# Patient Record
Sex: FEMALE | Race: BLACK OR AFRICAN AMERICAN | NOT HISPANIC OR LATINO | Employment: STUDENT | URBAN - METROPOLITAN AREA
[De-identification: names, ages, dates, MRNs, and addresses within clinical notes are randomized per-mention and may not be internally consistent; named-entity substitution may affect disease eponyms.]

---

## 2024-09-28 ENCOUNTER — HOSPITAL ENCOUNTER (EMERGENCY)
Facility: HOSPITAL | Age: 24
Discharge: HOME | End: 2024-09-28
Attending: STUDENT IN AN ORGANIZED HEALTH CARE EDUCATION/TRAINING PROGRAM
Payer: MEDICAID

## 2024-09-28 VITALS
HEIGHT: 64 IN | HEART RATE: 90 BPM | TEMPERATURE: 97 F | OXYGEN SATURATION: 100 % | DIASTOLIC BLOOD PRESSURE: 60 MMHG | WEIGHT: 120 LBS | SYSTOLIC BLOOD PRESSURE: 110 MMHG | RESPIRATION RATE: 18 BRPM | BODY MASS INDEX: 20.49 KG/M2

## 2024-09-28 DIAGNOSIS — F10.920 ALCOHOLIC INTOXICATION WITHOUT COMPLICATION: Primary | ICD-10-CM

## 2024-09-28 PROCEDURE — 96361 HYDRATE IV INFUSION ADD-ON: CPT

## 2024-09-28 PROCEDURE — 96374 THER/PROPH/DIAG INJ IV PUSH: CPT

## 2024-09-28 PROCEDURE — 99284 EMERGENCY DEPT VISIT MOD MDM: CPT | Mod: 25

## 2024-09-28 PROCEDURE — 96376 TX/PRO/DX INJ SAME DRUG ADON: CPT

## 2024-09-28 PROCEDURE — 63600175 PHARM REV CODE 636 W HCPCS

## 2024-09-28 PROCEDURE — 25000003 PHARM REV CODE 250

## 2024-09-28 RX ORDER — ONDANSETRON 4 MG/1
4 TABLET, ORALLY DISINTEGRATING ORAL
Status: DISCONTINUED | OUTPATIENT
Start: 2024-09-28 | End: 2024-09-28

## 2024-09-28 RX ORDER — ONDANSETRON HYDROCHLORIDE 2 MG/ML
4 INJECTION, SOLUTION INTRAVENOUS
Status: COMPLETED | OUTPATIENT
Start: 2024-09-28 | End: 2024-09-28

## 2024-09-28 RX ADMIN — ONDANSETRON 4 MG: 2 INJECTION INTRAMUSCULAR; INTRAVENOUS at 06:09

## 2024-09-28 RX ADMIN — ONDANSETRON 4 MG: 2 INJECTION INTRAMUSCULAR; INTRAVENOUS at 05:09

## 2024-09-28 RX ADMIN — SODIUM CHLORIDE 1000 ML: 9 INJECTION, SOLUTION INTRAVENOUS at 05:09

## 2024-09-28 NOTE — ED PROVIDER NOTES
Encounter Date: 9/28/2024       History     Chief Complaint   Patient presents with    Alcohol Intoxication     Pt arrived with friend c/o alcohol intoxication, states she started drinking last night, pt is from [place] and is on vacation.     23-year-old female with no reported past medical history presents for evaluation of alcohol intoxication, nausea, and vomiting.  Per patient and friend, patient went out drinking last night and had 2 shots of liquor and 2-1/2 mixed alcoholic drinks in a roughly 3 hour period.  Afterwards, patient became nauseated and vomited repeatedly.  She reports feeling generally unwell, abdominal pain, and dry heaving.  She denies chest pain, shortness of breath, bloody emesis, any other symptoms.  Patient and friend deny any other drug or alcohol use.    The history is provided by the patient and a friend. No  was used.     Review of patient's allergies indicates:   Allergen Reactions    Pcn [penicillins] Nausea And Vomiting     History reviewed. No pertinent past medical history.  History reviewed. No pertinent surgical history.  No family history on file.  Social History     Tobacco Use    Smoking status: Never    Smokeless tobacco: Never   Substance Use Topics    Alcohol use: Yes    Drug use: Never         Physical Exam     Initial Vitals [09/28/24 0501]   BP Pulse Resp Temp SpO2   (!) 114/58 105 20 96.7 °F (35.9 °C) 100 %      MAP       --         Physical Exam    Nursing note and vitals reviewed.  Constitutional: She appears distressed.   Cardiovascular:  Regular rhythm and normal heart sounds.   Tachycardia present.         Pulmonary/Chest: Breath sounds normal. No respiratory distress.   Abdominal: Abdomen is soft. There is no abdominal tenderness.     Neurological: She is alert.         ED Course   Procedures  Labs Reviewed   HIV 1 / 2 ANTIBODY   HEPATITIS C ANTIBODY   PREGNANCY TEST, URINE RAPID          Imaging Results    None          Medications   sodium  chloride 0.9% bolus 1,000 mL 1,000 mL (0 mLs Intravenous Stopped 9/28/24 0700)   ondansetron injection 4 mg (4 mg Intravenous Given 9/28/24 0542)   ondansetron injection 4 mg (4 mg Intravenous Given 9/28/24 0622)     Medical Decision Making  23-year-old female with no reported past medical history presents for evaluation of alcohol intoxication, nausea, and vomiting.    In emergency department, patient appears in moderate distress secondary to vomiting and retching, but hemodynamically stable.  Patient given 2 rounds of Zofran as well as IV fluids with significant improvement in symptoms.  On re-evaluation, patient is alert and interactive, with steady gait, tolerating p.o..  Friend is still with patient and agrees to patient to be discharged in her care; patient is agreeable with this plan.  Return precautions given.  Answered all questions.    Risk  Prescription drug management.  Risk Details: Patient received Zofran and IV fluids while in the emergency department.                                      Clinical Impression:  Final diagnoses:  [F10.920] Alcoholic intoxication without complication (Primary)          ED Disposition Condition    Discharge Stable          ED Prescriptions    None       Follow-up Information    None          Dong Bradley MD  Resident  09/28/24 3216

## 2024-09-28 NOTE — DISCHARGE INSTRUCTIONS
You were evaluated in the emergency department today for alcohol intoxication, nausea and vomiting.  Although there were no findings of concern to necessitate admission to the hospital or warrant immediate surgical intervention, disease exists on a spectrum and your disease process may progress.  If this is the case, please watch your symptoms and return to the emergency department if you feel worse and are unable to discuss care with your primary care doctor in follow up in the next several days.  Specific information regarding your complaint has been provided.  Thank you for choosing [facility]!  Follow up with your PCP in one week.

## 2024-09-28 NOTE — PROVIDER PROGRESS NOTES - EMERGENCY DEPT.
"Encounter Date: 9/28/2024    ED Physician Progress Notes        ED Resident HAND-OFF NOTE:  Jesenia Moore is a 23 y.o. female who presented to the ED on 9/28/2024, patient C/O acute intoxication. I assumed care of patient from off-going ED physician team patient pending clinical sobriety.    On my evaluation, Jesenia Moore appears well, hemodynamically stable and in NAD. Thus far, Jesenia Moore has received:  Medications   sodium chloride 0.9% bolus 1,000 mL 1,000 mL (0 mLs Intravenous Stopped 9/28/24 0700)   ondansetron injection 4 mg (4 mg Intravenous Given 9/28/24 0542)   ondansetron injection 4 mg (4 mg Intravenous Given 9/28/24 0622)       BP (!) 114/58 (BP Location: Right arm)   Pulse 105   Temp 96.7 °F (35.9 °C) (Axillary)   Resp 20   Ht 5' 4" (1.626 m)   Wt 54.4 kg (120 lb)   SpO2 100%   Breastfeeding No   BMI 20.60 kg/m²         Disposition: I anticipate patient will discharged  ______________________  Carloz Song MD   Emergency Medicine Resident      UPDATE:  Note was reassessed and found to be clinically sober.  She has been discharged with return precautions.        :  Alcoholic intoxication without complication (Primary)   "